# Patient Record
Sex: FEMALE | Race: OTHER
[De-identification: names, ages, dates, MRNs, and addresses within clinical notes are randomized per-mention and may not be internally consistent; named-entity substitution may affect disease eponyms.]

---

## 2020-05-23 ENCOUNTER — HOSPITAL ENCOUNTER (EMERGENCY)
Dept: HOSPITAL 61 - ER | Age: 40
Discharge: HOME | End: 2020-05-23
Payer: COMMERCIAL

## 2020-05-23 VITALS
SYSTOLIC BLOOD PRESSURE: 113 MMHG | DIASTOLIC BLOOD PRESSURE: 57 MMHG | SYSTOLIC BLOOD PRESSURE: 113 MMHG | SYSTOLIC BLOOD PRESSURE: 113 MMHG | DIASTOLIC BLOOD PRESSURE: 57 MMHG | DIASTOLIC BLOOD PRESSURE: 57 MMHG | SYSTOLIC BLOOD PRESSURE: 113 MMHG | DIASTOLIC BLOOD PRESSURE: 57 MMHG

## 2020-05-23 VITALS — BODY MASS INDEX: 26.74 KG/M2 | HEIGHT: 62 IN | WEIGHT: 145.28 LBS

## 2020-05-23 DIAGNOSIS — Z3A.12: ICD-10-CM

## 2020-05-23 DIAGNOSIS — O03.9: Primary | ICD-10-CM

## 2020-05-23 LAB
ALBUMIN SERPL-MCNC: 3.6 G/DL (ref 3.4–5)
ALBUMIN/GLOB SERPL: 0.9 {RATIO} (ref 1–1.7)
ALP SERPL-CCNC: 54 U/L (ref 46–116)
ALT SERPL-CCNC: 16 U/L (ref 14–59)
ANION GAP SERPL CALC-SCNC: 13 MMOL/L (ref 6–14)
AST SERPL-CCNC: 12 U/L (ref 15–37)
BASOPHILS # BLD AUTO: 0 X10^3/UL (ref 0–0.2)
BASOPHILS NFR BLD: 0 % (ref 0–3)
BILIRUB SERPL-MCNC: 0.2 MG/DL (ref 0.2–1)
BUN SERPL-MCNC: 9 MG/DL (ref 7–20)
BUN/CREAT SERPL: 15 (ref 6–20)
CALCIUM SERPL-MCNC: 8.9 MG/DL (ref 8.5–10.1)
CHLORIDE SERPL-SCNC: 104 MMOL/L (ref 98–107)
CO2 SERPL-SCNC: 24 MMOL/L (ref 21–32)
CREAT SERPL-MCNC: 0.6 MG/DL (ref 0.6–1)
EOSINOPHIL NFR BLD: 0.1 X10^3/UL (ref 0–0.7)
EOSINOPHIL NFR BLD: 1 % (ref 0–3)
ERYTHROCYTE [DISTWIDTH] IN BLOOD BY AUTOMATED COUNT: 14.1 % (ref 11.5–14.5)
GFR SERPLBLD BASED ON 1.73 SQ M-ARVRAT: 111.9 ML/MIN
GLOBULIN SER-MCNC: 4 G/DL (ref 2.2–3.8)
GLUCOSE SERPL-MCNC: 108 MG/DL (ref 70–99)
HCT VFR BLD CALC: 38.2 % (ref 36–47)
HGB BLD-MCNC: 12.9 G/DL (ref 12–15.5)
LYMPHOCYTES # BLD: 1.9 X10^3/UL (ref 1–4.8)
LYMPHOCYTES NFR BLD AUTO: 19 % (ref 24–48)
MCH RBC QN AUTO: 30 PG (ref 25–35)
MCHC RBC AUTO-ENTMCNC: 34 G/DL (ref 31–37)
MCV RBC AUTO: 90 FL (ref 79–100)
MONO #: 0.5 X10^3/UL (ref 0–1.1)
MONOCYTES NFR BLD: 5 % (ref 0–9)
NEUT #: 7.7 X10^3/UL (ref 1.8–7.7)
NEUTROPHILS NFR BLD AUTO: 75 % (ref 31–73)
PLATELET # BLD AUTO: 365 X10^3/UL (ref 140–400)
POTASSIUM SERPL-SCNC: 3.8 MMOL/L (ref 3.5–5.1)
PROT SERPL-MCNC: 7.6 G/DL (ref 6.4–8.2)
PROTHROMBIN TIME: 12.9 SEC (ref 11.7–14)
RBC # BLD AUTO: 4.23 X10^6/UL (ref 3.5–5.4)
SODIUM SERPL-SCNC: 141 MMOL/L (ref 136–145)
WBC # BLD AUTO: 10.2 X10^3/UL (ref 4–11)

## 2020-05-23 PROCEDURE — 76817 TRANSVAGINAL US OBSTETRIC: CPT

## 2020-05-23 PROCEDURE — 84702 CHORIONIC GONADOTROPIN TEST: CPT

## 2020-05-23 PROCEDURE — 85610 PROTHROMBIN TIME: CPT

## 2020-05-23 PROCEDURE — 85025 COMPLETE CBC W/AUTO DIFF WBC: CPT

## 2020-05-23 PROCEDURE — 76801 OB US < 14 WKS SINGLE FETUS: CPT

## 2020-05-23 PROCEDURE — 86901 BLOOD TYPING SEROLOGIC RH(D): CPT

## 2020-05-23 PROCEDURE — 80053 COMPREHEN METABOLIC PANEL: CPT

## 2020-05-23 PROCEDURE — 99284 EMERGENCY DEPT VISIT MOD MDM: CPT

## 2020-05-23 PROCEDURE — 86900 BLOOD TYPING SEROLOGIC ABO: CPT

## 2020-05-23 PROCEDURE — 36415 COLL VENOUS BLD VENIPUNCTURE: CPT

## 2020-05-23 NOTE — RAD
INDICATION: Vaginal bleeding

 

COMPARISON: None.

 

TECHNIQUE: Grayscale and color ultrasound images uterus and adnexa.  

Transabdominal and transvaginal images obtained.  Transvaginal images were

needed to better visualize structures that were limited on transabdominal 

imaging.

 

FINDINGS: 

 

Uterus: 110 x 60 x 60 mm.

Endometrial Stripe: 13 mm there is some fluid within the cervical region 

with internal echoes.

 

The bilateral ovaries are obscured by bowel gas.

 

IMPRESSION: 

 

*   There is some fluid within the cervical region and lower uterine 

segment with some internal echoes. Could be secondary to blood products.

 

*  No definite gestational sac or fetal pole is seen within the 

endometrium.

 

*  The ovaries are obscured by bowel gas.

 

Electronically signed by: Danny Mccoy MD (5/23/2020 7:31 AM) [id number]

## 2020-05-23 NOTE — PHYS DOC
Past Medical History


Past Medical History:  No Pertinent History


Past Surgical History:  No Surgical History


Smoking Status:  Never Smoker


Alcohol Use:  None





General Adult


EDM:


Chief Complaint:  VAGINAL BLEEDING PREGNANCY





HPI:


HPI:





Patient is a 40-year-old G4, P3 who states she is approximately 12 weeks 

pregnant presents with vaginal bleeding and some lower abdominal discomfort.  

She states she is gone through 3 pads overnight.  She denies any fever chills or

sweats.  She has had no dysuria.  She had no trauma.  []





Review of Systems:


Review of Systems:


Constitutional:  Denies fever or chills. []


Eyes:  Denies change in visual acuity. []


HENT:  Denies nasal congestion or sore throat. [] 


Respiratory:  Denies cough or shortness of breath. [] 


Cardiovascular:  Denies chest pain or edema. [] 


GI:  Denies abdominal pain, nausea, vomiting, bloody stools or diarrhea. [] 


:  D Per HPI [] 


Musculoskeletal:  Denies back pain or joint pain. [] 


Integument:  Denies rash. [] 


Neurologic:  Denies headache, focal weakness or sensory changes. [] 


Endocrine:  Denies polyuria or polydipsia. [] 


Lymphatic:  Denies swollen glands. [] 


Psychiatric:  Denies depression or anxiety. []





Heart Score:


Risk Factors:


Risk Factors:  DM, Current or recent (<one month) smoker, HTN, HLP, family 

history of CAD, obesity.


Risk Scores:


Score 0 - 3:  2.5% MACE over next 6 weeks - Discharge Home


Score 4 - 6:  20.3% MACE over next 6 weeks - Admit for Clinical Observation


Score 7 - 10:  72.7% MACE over next 6 weeks - Early Invasive Strategies





Allergies:


Allergies:





Allergies








Coded Allergies Type Severity Reaction Last Updated Verified


 


  No Known Drug Allergies    20 No











Physical Exam:


PE:





Constitutional: Well developed, well nourished, no acute distress, non-toxic 

appearance. []


HENT: Normocephalic, atraumatic, bilateral external ears normal, oropharynx 

moist, no oral exudates, nose normal. []


Eyes: PERRLA, EOMI, conjunctiva normal, no discharge. [] 


Neck: Normal range of motion, no tenderness, supple, no stridor. [] 


Cardiovascular:Heart rate regular rhythm, no murmur []


Lungs & Thorax:  Bilateral breath sounds clear to auscultation []


Abdomen: Bowel sounds normal, soft, no tenderness, no masses, no pulsatile m

asses. [] 


Skin: Warm, dry, no erythema, no rash. [] 


Back: No tenderness, no CVA tenderness. [] 


Extremities: No tenderness, no cyanosis, no clubbing, ROM intact, no edema. [] 


Neurologic: Alert and oriented X 3, normal motor function, normal sensory 

function, no focal deficits noted. []


Psychologic: Affect normal, judgement normal, mood normal. []





Current Patient Data:


Labs:





                                Laboratory Tests








Test


 20


06:24


 


White Blood Count


 10.2 x10^3/uL


(4.0-11.0)


 


Red Blood Count


 4.23 x10^6/uL


(3.50-5.40)


 


Hemoglobin


 12.9 g/dL


(12.0-15.5)


 


Hematocrit


 38.2 %


(36.0-47.0)


 


Mean Corpuscular Volume


 90 fL ()





 


Mean Corpuscular Hemoglobin 30 pg (25-35)  


 


Mean Corpuscular Hemoglobin


Concent 34 g/dL


(31-37)


 


Red Cell Distribution Width


 14.1 %


(11.5-14.5)


 


Platelet Count


 365 x10^3/uL


(140-400)


 


Neutrophils (%) (Auto) 75 % (31-73)  H


 


Lymphocytes (%) (Auto) 19 % (24-48)  L


 


Monocytes (%) (Auto) 5 % (0-9)  


 


Eosinophils (%) (Auto) 1 % (0-3)  


 


Basophils (%) (Auto) 0 % (0-3)  


 


Neutrophils # (Auto)


 7.7 x10^3/uL


(1.8-7.7)


 


Lymphocytes # (Auto)


 1.9 x10^3/uL


(1.0-4.8)


 


Monocytes # (Auto)


 0.5 x10^3/uL


(0.0-1.1)


 


Eosinophils # (Auto)


 0.1 x10^3/uL


(0.0-0.7)


 


Basophils # (Auto)


 0.0 x10^3/uL


(0.0-0.2)


 


Prothrombin Time


 12.9 SEC


(11.7-14.0)


 


Prothrombin Time INR 1.0 (0.8-1.1)  


 


Maternal Serum HCG Beta


Subunit 9356 mIU/mL


(0-5)  H


 


Sodium Level


 141 mmol/L


(136-145)


 


Potassium Level


 3.8 mmol/L


(3.5-5.1)


 


Chloride Level


 104 mmol/L


()


 


Carbon Dioxide Level


 24 mmol/L


(21-32)


 


Anion Gap 13 (6-14)  


 


Blood Urea Nitrogen


 9 mg/dL (7-20)





 


Creatinine


 0.6 mg/dL


(0.6-1.0)


 


Estimated GFR


(Cockcroft-Gault) 111.9  





 


BUN/Creatinine Ratio 15 (6-20)  


 


Glucose Level


 108 mg/dL


(70-99)  H


 


Calcium Level


 8.9 mg/dL


(8.5-10.1)


 


Total Bilirubin


 0.2 mg/dL


(0.2-1.0)


 


Aspartate Amino Transferase


(AST) 12 U/L (15-37)


L


 


Alanine Aminotransferase (ALT)


 16 U/L (14-59)





 


Alkaline Phosphatase


 54 U/L


()


 


Total Protein


 7.6 g/dL


(6.4-8.2)


 


Albumin


 3.6 g/dL


(3.4-5.0)


 


Albumin/Globulin Ratio


 0.9 (1.0-1.7)


L





                                Laboratory Tests


20 06:24








                                Laboratory Tests


20 06:24








Vital Signs:





                                   Vital Signs








  Date Time  Temp Pulse Resp B/P (MAP) Pulse Ox O2 Delivery O2 Flow Rate FiO2


 


20 05:49 98.4 86 16 124/59 (80) 100 Room Air  





 98.4       











EKG:


EKG:


[]





Radiology/Procedures:


Radiology/Procedures:


[]PROCEDURE: OB <14 WKS W/TV





 


INDICATION: Vaginal bleeding


 


COMPARISON: None.


 


TECHNIQUE: Grayscale and color ultrasound images uterus and adnexa.  


Transabdominal and transvaginal images obtained.  Transvaginal images were


needed to better visualize structures that were limited on transabdominal 


imaging.


 


FINDINGS: 


 


Uterus: 110 x 60 x 60 mm.


Endometrial Stripe: 13 mm there is some fluid within the cervical region 


with internal echoes.


 


The bilateral ovaries are obscured by bowel gas.


 


IMPRESSION: 


 


*   There is some fluid within the cervical region and lower uterine 


segment with some internal echoes. Could be secondary to blood products.


 


*  No definite gestational sac or fetal pole is seen within the 


endometrium.


 


*  The ovaries are obscured by bowel gas.





Course & Med Decision Making:


Course & Med Decision Making


Pertinent Labs and Imaging studies reviewed. (See chart for details)





[]





Dragon Disclaimer:


Dragon Disclaimer:


This electronic medical record was generated, in whole or in part, using a voice

 recognition dictation system.





Departure


Departure


Impression:  


   Primary Impression:  


   Spontaneous 


Disposition:   HOME, SELF-CARE


Condition:  STABLE


Referrals:  


NO PCP (PCP)


Patient Instructions:  Miscarriage





Additional Instructions:  


Return to the emergency department with any new or concerning symptoms











MELI RÍOS DO             May 23, 2020 07:40

## 2020-10-05 ENCOUNTER — HOSPITAL ENCOUNTER (OUTPATIENT)
Dept: HOSPITAL 61 - LAB | Age: 40
Discharge: HOME | End: 2020-10-05
Attending: OBSTETRICS & GYNECOLOGY
Payer: COMMERCIAL

## 2020-10-05 DIAGNOSIS — Z32.01: Primary | ICD-10-CM

## 2020-10-05 DIAGNOSIS — Z3A.00: ICD-10-CM

## 2020-10-05 LAB
BASOPHILS # BLD AUTO: 0 X10^3/UL (ref 0–0.2)
BASOPHILS NFR BLD: 0 % (ref 0–3)
EOSINOPHIL NFR BLD: 0.1 X10^3/UL (ref 0–0.7)
EOSINOPHIL NFR BLD: 1 % (ref 0–3)
ERYTHROCYTE [DISTWIDTH] IN BLOOD BY AUTOMATED COUNT: 13.8 % (ref 11.5–14.5)
HCT VFR BLD CALC: 36.2 % (ref 36–47)
HGB BLD-MCNC: 12.2 G/DL (ref 12–15.5)
LYMPHOCYTES # BLD: 2.9 X10^3/UL (ref 1–4.8)
LYMPHOCYTES NFR BLD AUTO: 24 % (ref 24–48)
MCH RBC QN AUTO: 30 PG (ref 25–35)
MCHC RBC AUTO-ENTMCNC: 34 G/DL (ref 31–37)
MCV RBC AUTO: 90 FL (ref 79–100)
MONO #: 0.6 X10^3/UL (ref 0–1.1)
MONOCYTES NFR BLD: 5 % (ref 0–9)
NEUT #: 8.3 X10^3/UL (ref 1.8–7.7)
NEUTROPHILS NFR BLD AUTO: 70 % (ref 31–73)
PLATELET # BLD AUTO: 335 X10^3/UL (ref 140–400)
RBC # BLD AUTO: 4.03 X10^6/UL (ref 3.5–5.4)
WBC # BLD AUTO: 11.9 X10^3/UL (ref 4–11)

## 2020-10-05 PROCEDURE — 87340 HEPATITIS B SURFACE AG IA: CPT

## 2020-10-05 PROCEDURE — 86900 BLOOD TYPING SEROLOGIC ABO: CPT

## 2020-10-05 PROCEDURE — 86762 RUBELLA ANTIBODY: CPT

## 2020-10-05 PROCEDURE — 85025 COMPLETE CBC W/AUTO DIFF WBC: CPT

## 2020-10-05 PROCEDURE — 86850 RBC ANTIBODY SCREEN: CPT

## 2020-10-05 PROCEDURE — 86703 HIV-1/HIV-2 1 RESULT ANTBDY: CPT

## 2020-10-05 PROCEDURE — 86901 BLOOD TYPING SEROLOGIC RH(D): CPT

## 2020-10-05 PROCEDURE — 86592 SYPHILIS TEST NON-TREP QUAL: CPT

## 2020-10-05 PROCEDURE — 36415 COLL VENOUS BLD VENIPUNCTURE: CPT

## 2021-01-29 ENCOUNTER — HOSPITAL ENCOUNTER (OUTPATIENT)
Dept: HOSPITAL 61 - US | Age: 41
End: 2021-01-29
Attending: OBSTETRICS & GYNECOLOGY
Payer: COMMERCIAL

## 2021-01-29 DIAGNOSIS — Z3A.22: ICD-10-CM

## 2021-01-29 DIAGNOSIS — O26.842: Primary | ICD-10-CM

## 2021-01-29 PROCEDURE — 76805 OB US >/= 14 WKS SNGL FETUS: CPT

## 2021-01-29 NOTE — RAD
EXAM: OB ULTRASOUND, > 14 WEEKS 



HISTORY: Size and dates.



COMPARISON: 5/23/2020.



TECHNIQUE: Multiple grayscale images, color Doppler, and M-mode images of the uterus are obtained.



FINDINGS:



There is a single intrauterine gestation in cephalic presentation. There is placenta previa. The amou
nt of amniotic fluid appears low normal.  Amniotic fluid index is 7.6 cm. The cervical length is not 
well assessed due to placental location.



Biometrical data:



BPD = 5.53 cm for 22 weeks 6 days.

HC   = 20.20 cm for 22 weeks 2 days.

AC   = 17.16 cm for 22 weeks 1 days.

FL    = 3.74 cm for  21 weeks 6 days.

HC/AC ratio = 1.18.



Overall, the estimated sonographic gestational age is 22 weeks and 2 days for an estimated date of de
livery of 6/2/2021. The estimated fetal weight is 476 g.



A 4 chamber heart is identified with positive cardiac activity. The estimated fetal heart rate is 160
 beats per minute. 



Bilateral upper and lower extremities are identified. 



There is a three-vessel cord with cord insertion visualized. 



Fetal stomach and urinary bladder are identified. Both kidneys are seen.  



The fetal spine and brain are unremarkable. 



No obvious fetal anatomic abnormalities are identified.



The maternal adnexal regions are obscured.



IMPRESSION:



1. Single intrauterine fetus with a normal heart rate and gestational age patient ultrasound measurem
ents of 22 weeks and 2 days. 

2. Placenta previa. Short-term sonographic follow-up is recommended to assess for interval change.

3. Low normal CARRIE of 7.6 cm.



Electronically signed by: Marcela Vergara MD (1/29/2021 5:32 PM) [id number]

## 2021-03-31 ENCOUNTER — HOSPITAL ENCOUNTER (OUTPATIENT)
Dept: HOSPITAL 61 - US | Age: 41
End: 2021-03-31
Attending: OBSTETRICS & GYNECOLOGY
Payer: COMMERCIAL

## 2021-03-31 DIAGNOSIS — Z3A.31: ICD-10-CM

## 2021-03-31 DIAGNOSIS — O09.93: Primary | ICD-10-CM

## 2021-03-31 PROCEDURE — 76815 OB US LIMITED FETUS(S): CPT

## 2021-04-01 NOTE — RAD
Limited OB ultrasound greater than 14 weeks 3/31/2021



Clinical History: Third trimester pregnancy. Follow-up placenta previa.



Technique: A real-time ultrasound examination of the gravid uterus was performed. Multiple images wer
e obtained.



Findings: Comparison study is dated 1/29/2021.



There is a single living IUP. The fetus is in a cephalic position. Fetal cardiac and somatic activity
 is seen. The fetal heart rate is 149 beats per minutes. The maternal cervix is closed. It measures 6
.34 cm in length. The placenta is posterior. The inferior edge of the placenta is again noted to exte
nd across the internal cervical os consistent with placenta previa. This has not significantly change
d since previous examination. The amniotic fluid volume is within normal limits. The CARRIE measures 11.
1 cm. Neither maternal ovary is visualized.



The following fetal measurements were obtained:



BPD 7.64cm 30 weeks 5 days



HC 28.03 cm 30weeks 5 days



AC 28.39 cm 32weeks 3 days



 cm 30 weeks 3 days



The estimated gestational age by ultrasound on today's study is 31 weeks 1 days plus or minus a stand
janet deviation of 3 weeks. Since the previous examination there has been appropriate interval fetal gr
owth. The estimated fetal weight is 1778 g +/- 263 g (3 lbs. 15 oz.).



No fetal abnormality is seen. 



Impression:

1. Single living IUP with an estimated gestational age by ultrasound of 31 weeks1 days +/- a standard
 deviation of 3 weeks. Since the previous examination there has been appropriate interval fetal growt
h.

2. The inferior edge of the posterior placenta is again noted to cover the internal cervical os consi
stent with placenta previa.



Electronically signed by: Yon De Oliveira MD (4/1/2021 3:13 PM) [id number]